# Patient Record
Sex: MALE | Race: BLACK OR AFRICAN AMERICAN | NOT HISPANIC OR LATINO | URBAN - METROPOLITAN AREA
[De-identification: names, ages, dates, MRNs, and addresses within clinical notes are randomized per-mention and may not be internally consistent; named-entity substitution may affect disease eponyms.]

---

## 2024-07-16 ENCOUNTER — LAB (OUTPATIENT)
Dept: LAB | Facility: LAB | Age: 84
End: 2024-07-16
Payer: MEDICARE

## 2024-07-16 ENCOUNTER — OFFICE VISIT (OUTPATIENT)
Dept: URGENT CARE | Facility: CLINIC | Age: 84
End: 2024-07-16
Payer: MEDICARE

## 2024-07-16 VITALS — DIASTOLIC BLOOD PRESSURE: 64 MMHG | SYSTOLIC BLOOD PRESSURE: 114 MMHG | HEART RATE: 69 BPM | OXYGEN SATURATION: 96 %

## 2024-07-16 DIAGNOSIS — E11.9 CONTROLLED TYPE 2 DIABETES MELLITUS WITHOUT COMPLICATION, WITHOUT LONG-TERM CURRENT USE OF INSULIN (MULTI): Primary | ICD-10-CM

## 2024-07-16 DIAGNOSIS — R42 DIZZINESS: ICD-10-CM

## 2024-07-16 DIAGNOSIS — D64.9 ANEMIA, UNSPECIFIED TYPE: ICD-10-CM

## 2024-07-16 LAB
ALBUMIN SERPL BCP-MCNC: 4.3 G/DL (ref 3.4–5)
ALP SERPL-CCNC: 45 U/L (ref 33–136)
ALT SERPL W P-5'-P-CCNC: 14 U/L (ref 10–52)
ANION GAP SERPL CALC-SCNC: 11 MMOL/L (ref 10–20)
AST SERPL W P-5'-P-CCNC: 25 U/L (ref 9–39)
BASOPHILS # BLD AUTO: 0.02 X10*3/UL (ref 0–0.1)
BASOPHILS NFR BLD AUTO: 0.3 %
BILIRUB SERPL-MCNC: 0.7 MG/DL (ref 0–1.2)
BUN SERPL-MCNC: 19 MG/DL (ref 6–23)
CALCIUM SERPL-MCNC: 9.5 MG/DL (ref 8.6–10.3)
CHLORIDE SERPL-SCNC: 103 MMOL/L (ref 98–107)
CO2 SERPL-SCNC: 28 MMOL/L (ref 21–32)
CREAT SERPL-MCNC: 1.17 MG/DL (ref 0.5–1.3)
EGFRCR SERPLBLD CKD-EPI 2021: 62 ML/MIN/1.73M*2
EOSINOPHIL # BLD AUTO: 0.07 X10*3/UL (ref 0–0.4)
EOSINOPHIL NFR BLD AUTO: 0.9 %
ERYTHROCYTE [DISTWIDTH] IN BLOOD BY AUTOMATED COUNT: 13.2 % (ref 11.5–14.5)
GLUCOSE SERPL-MCNC: 141 MG/DL (ref 74–99)
HCT VFR BLD AUTO: 38.9 % (ref 41–52)
HGB BLD-MCNC: 11.4 G/DL (ref 13.5–17.5)
IMM GRANULOCYTES # BLD AUTO: 0.03 X10*3/UL (ref 0–0.5)
IMM GRANULOCYTES NFR BLD AUTO: 0.4 % (ref 0–0.9)
LYMPHOCYTES # BLD AUTO: 2.32 X10*3/UL (ref 0.8–3)
LYMPHOCYTES NFR BLD AUTO: 30.4 %
MCH RBC QN AUTO: 26.6 PG (ref 26–34)
MCHC RBC AUTO-ENTMCNC: 29.3 G/DL (ref 32–36)
MCV RBC AUTO: 91 FL (ref 80–100)
MONOCYTES # BLD AUTO: 0.66 X10*3/UL (ref 0.05–0.8)
MONOCYTES NFR BLD AUTO: 8.7 %
NEUTROPHILS # BLD AUTO: 4.52 X10*3/UL (ref 1.6–5.5)
NEUTROPHILS NFR BLD AUTO: 59.3 %
NRBC BLD-RTO: 0 /100 WBCS (ref 0–0)
PLATELET # BLD AUTO: 136 X10*3/UL (ref 150–450)
POC APPEARANCE, URINE: CLEAR
POC BILIRUBIN, URINE: NEGATIVE
POC BLOOD, URINE: NEGATIVE
POC COLOR, URINE: YELLOW
POC FINGERSTICK BLOOD GLUCOSE: 208 MG/DL (ref 70–100)
POC GLUCOSE, URINE: ABNORMAL MG/DL
POC KETONES, URINE: ABNORMAL MG/DL
POC LEUKOCYTES, URINE: NEGATIVE
POC NITRITE,URINE: NEGATIVE
POC PH, URINE: 6.5 PH
POC PROTEIN, URINE: ABNORMAL MG/DL
POC SPECIFIC GRAVITY, URINE: 1.02
POC UROBILINOGEN, URINE: 2 EU/DL
POTASSIUM SERPL-SCNC: 4.3 MMOL/L (ref 3.5–5.3)
PROT SERPL-MCNC: 7 G/DL (ref 6.4–8.2)
RBC # BLD AUTO: 4.29 X10*6/UL (ref 4.5–5.9)
SODIUM SERPL-SCNC: 138 MMOL/L (ref 136–145)
WBC # BLD AUTO: 7.6 X10*3/UL (ref 4.4–11.3)

## 2024-07-16 PROCEDURE — 36415 COLL VENOUS BLD VENIPUNCTURE: CPT

## 2024-07-16 PROCEDURE — 81002 URINALYSIS NONAUTO W/O SCOPE: CPT | Performed by: PHYSICIAN ASSISTANT

## 2024-07-16 PROCEDURE — 83550 IRON BINDING TEST: CPT

## 2024-07-16 PROCEDURE — 80053 COMPREHEN METABOLIC PANEL: CPT

## 2024-07-16 PROCEDURE — 82962 GLUCOSE BLOOD TEST: CPT | Performed by: PHYSICIAN ASSISTANT

## 2024-07-16 PROCEDURE — 85025 COMPLETE CBC W/AUTO DIFF WBC: CPT

## 2024-07-16 PROCEDURE — 83540 ASSAY OF IRON: CPT

## 2024-07-16 PROCEDURE — 99204 OFFICE O/P NEW MOD 45 MIN: CPT | Performed by: PHYSICIAN ASSISTANT

## 2024-07-16 RX ORDER — DUTASTERIDE 0.5 MG/1
0.5 CAPSULE, LIQUID FILLED ORAL
COMMUNITY
Start: 2024-05-10

## 2024-07-16 RX ORDER — PIOGLITAZONEHYDROCHLORIDE 45 MG/1
45 TABLET ORAL
COMMUNITY
Start: 2022-11-21

## 2024-07-16 RX ORDER — GABAPENTIN 100 MG/1
1 CAPSULE ORAL 3 TIMES DAILY PRN
COMMUNITY
Start: 2023-12-18 | End: 2024-12-17

## 2024-07-16 RX ORDER — SIMVASTATIN 20 MG/1
1 TABLET, FILM COATED ORAL NIGHTLY
COMMUNITY
Start: 2022-11-21

## 2024-07-16 RX ORDER — NAPROXEN SODIUM 220 MG/1
81 TABLET, FILM COATED ORAL
COMMUNITY

## 2024-07-16 RX ORDER — LISINOPRIL 10 MG/1
10 TABLET ORAL
COMMUNITY
Start: 2024-06-28

## 2024-07-16 ASSESSMENT — ENCOUNTER SYMPTOMS: DIZZINESS: 1

## 2024-07-16 NOTE — PROGRESS NOTES
"Subjective   Patient ID: Rush Arias is a 83 y.o. male who presents for Dizziness.      Dizziness     Pt does have memory issues at baseline. Is accompanied by wife today. History given by pt with input from wife as well. States that yesterday, was sitting on the porch, fell asleep, and then woke up and looked upward and felt dizzy. Went up to go into the house and stumbled. Got into the house and grabbed his phone and called his wife. Pt describes his dizziness as \"Can't keep my balance, every time I gets up to try to walk I lose my balance. When I step to the left I fall to the left.\" After calling his wife, he sat still until his wife gets home. Wife states that his BP was 126/56 when she came home. Felt \"much better\" after the initial dizziness and after wife checked his BP. Today, was noticing that he was not as steady as he normally is. He is diabetic and his sugar was high 2-3 months ago because he was drinking sugary drinks. Wife brought him sugar free Ice drinks and sugar free candy, and he was concerned that it was maybe that causing him to feel this way. Did not check the sugars when he was feeling dizzy. Tried to get qualified for a CGM, but does not qualify because he is no longer on insulin. A1c did jump from 7 to 10 a couple of months ago, but pt brought it back down to 7 after making the diet changes. Wife notes that last week, pt also fell outside. He did not fall on cement, fell on the grass - has 4 steps and missed a step and didn't catch himself per pt - was not really due to dizziness, was more mechanical. Neighbor saw this happen, was getting up on his own. Did not hit his head and did not lose consciousness. He does have Hx of multiple myeloma in remission - seeing hematologist later this week. Wife states that her PCP told him to come to urgent care for further evaluation when he called their office. Denies any new medications. Has not missed any of his medication doses - wife makes sure " of this Denies: fever, chills, headache, CP, SOB, abdominal pain, N/V/D, rash, swelling, ear pain, sore throat, difficulty urinating, burning with urination, blood in the urine. The last time the sugar was checked was when he was last at his PCP's office, which was 6/20/24. He does have a Hx of a stent, sees cardiology Dr. Jewel Duarte on 6/12/24. EKG was done at that time - no changes. Dr. Lerma is pt's hematologist/oncologist ([facility]) - sees him 7/18/24.     Review of Systems   Neurological:  Positive for dizziness.   All other systems reviewed and are negative.  Pertinent ROS included in HPI.    Objective   /64   Pulse 69   SpO2 96%   Weight and heigh not performed due to dizziness - balance concern on scale. Pt was also brought back as a rapid assess due to age and chief complaint.     Physical Exam  Vitals reviewed.   Constitutional:       General: He is awake.      Appearance: Normal appearance. He is well-developed.   HENT:      Head: Normocephalic and atraumatic.      Right Ear: Tympanic membrane and ear canal normal.      Left Ear: Tympanic membrane and ear canal normal.      Ears:      Comments: Small amount of cerumen noted in the L ear, was removed during exam.     Nose: Nose normal.      Mouth/Throat:      Lips: Pink.      Mouth: Mucous membranes are moist.      Pharynx: Oropharynx is clear.   Cardiovascular:      Rate and Rhythm: Normal rate and regular rhythm.   Pulmonary:      Effort: Pulmonary effort is normal.      Breath sounds: Normal breath sounds.   Musculoskeletal:      Cervical back: Full passive range of motion without pain.      Right lower leg: No edema.      Left lower leg: No edema.   Skin:     General: Skin is warm and dry.      Findings: No lesion or rash.   Neurological:      General: No focal deficit present.      Mental Status: He is alert and oriented to person, place, and time.      Cranial Nerves: Cranial nerves 2-12 are intact. No facial asymmetry.      Motor: Motor  "function is intact.      Coordination: Romberg sign negative. Coordination normal. Finger-Nose-Finger Test and Heel to Shin Test normal.      Gait: Gait is intact.   Psychiatric:         Attention and Perception: Attention normal.         Mood and Affect: Mood and affect normal.         Assessment/Plan   Diagnoses and all orders for this visit:  Controlled type 2 diabetes mellitus without complication, without long-term current use of insulin (Multi)  -     POCT fingerstick glucose manually resulted  Dizziness  -     POCT UA (nonautomated w/o microscopy) manually resulted  -     CBC and Auto Differential; Future  -     Comprehensive metabolic panel; Future  Anemia, unspecified type  -     Iron and TIBC; Future    Sugar is 208 today, nonfasting. Ate shah, egg, sausage bowl from [employer]. They are low carb. Also had a sweet afterward as well. This is about normal per pt.   Recent appt with cardiologist was okay - normal EKG at that time. Having no presyncope/dizziness, more \"clumsy,\" no CP, SOB, chest pressure. Declined additional EKG at this time.   Pt's BP is okay today, but was reading fairly low (diastolic) when wife checked it at home during an episode of dizziness. Discussed that this can cause dizziness or lightheadedness. Recommend to continue monitoring this at home. If BP is < 100 systolic and < 60 diastolic, hold on BP medication and call PCP's office.   UA shows no evidence of a UTI - only some protein, ketones, glucose likely related to DM2.   Labs ordered as above and were drawn for further evaluation   Suspect vertiginous-type cause given description of symptoms. No significant abnormalities here on exam. Should schedule prompt follow up with PCP Dr. Raymond for any further evaluation or treatment needed, as well as continue follow up with specialists.   Make sure to drink plenty of water.   Can try Meclizine or Dramamine for dizziness, and make sure to drink plenty of water.      "

## 2024-07-16 NOTE — PATIENT INSTRUCTIONS
Your sugar today was 208  Your urine did not show any signs of infection or blood   We are getting a CBC (blood count) and CMP (sugar, electrolytes, kidney/liver function) to further assess your symptoms  You just saw cardiology and deny any chest pain, shortness of breath. You are not having lightheadedness - we deferred an EKG today for this reason.   Your left ear was cleared of wax today.   Your examination was essentially normal.   If worsening symptoms or loss of consciousness, please go to the ER. Otherwise, please call your PCP to schedule a follow up appointment, and continue with follow up with hem/onc Dr. Lerma on Thursday.

## 2024-07-17 ENCOUNTER — TELEPHONE (OUTPATIENT)
Dept: PRIMARY CARE | Facility: CLINIC | Age: 84
End: 2024-07-17
Payer: MEDICARE

## 2024-07-17 LAB
IRON SATN MFR SERPL: 38 % (ref 25–45)
IRON SERPL-MCNC: 118 UG/DL (ref 35–150)
TIBC SERPL-MCNC: 314 UG/DL (ref 240–445)
UIBC SERPL-MCNC: 196 UG/DL (ref 110–370)

## 2024-07-17 NOTE — TELEPHONE ENCOUNTER
Please fax lab results from yesterday to pt's PCP Dr. Raymond and hem/onc Dr. Frey. Phone and fax numbers are below:    Dr. Raymond  P: [phone]  F: [phone]    Dr. Lerma  P: [phone]  F: [phone]